# Patient Record
Sex: FEMALE | Race: WHITE | Employment: UNEMPLOYED | URBAN - METROPOLITAN AREA
[De-identification: names, ages, dates, MRNs, and addresses within clinical notes are randomized per-mention and may not be internally consistent; named-entity substitution may affect disease eponyms.]

---

## 2021-11-22 ENCOUNTER — OFFICE VISIT (OUTPATIENT)
Dept: FAMILY MEDICINE CLINIC | Age: 21
End: 2021-11-22
Payer: COMMERCIAL

## 2021-11-22 VITALS
WEIGHT: 115 LBS | DIASTOLIC BLOOD PRESSURE: 78 MMHG | HEART RATE: 78 BPM | TEMPERATURE: 98.3 F | SYSTOLIC BLOOD PRESSURE: 110 MMHG | BODY MASS INDEX: 20.38 KG/M2 | HEIGHT: 63 IN

## 2021-11-22 DIAGNOSIS — R63.4 WEIGHT LOSS: ICD-10-CM

## 2021-11-22 DIAGNOSIS — F98.8 ATTENTION DEFICIT DISORDER (ADD) WITHOUT HYPERACTIVITY: ICD-10-CM

## 2021-11-22 DIAGNOSIS — F32.A ANXIETY AND DEPRESSION: Primary | ICD-10-CM

## 2021-11-22 DIAGNOSIS — Z23 NEEDS FLU SHOT: ICD-10-CM

## 2021-11-22 DIAGNOSIS — R63.8 ALTERATION IN APPETITE: ICD-10-CM

## 2021-11-22 DIAGNOSIS — Z65.9 POOR SOCIAL SITUATION: ICD-10-CM

## 2021-11-22 DIAGNOSIS — Z23 NEED FOR COVID-19 VACCINE: ICD-10-CM

## 2021-11-22 DIAGNOSIS — F41.9 ANXIETY AND DEPRESSION: Primary | ICD-10-CM

## 2021-11-22 DIAGNOSIS — Z13.31 POSITIVE DEPRESSION SCREENING: ICD-10-CM

## 2021-11-22 PROCEDURE — 99204 OFFICE O/P NEW MOD 45 MIN: CPT | Performed by: FAMILY MEDICINE

## 2021-11-22 PROCEDURE — G8431 POS CLIN DEPRES SCRN F/U DOC: HCPCS | Performed by: FAMILY MEDICINE

## 2021-11-22 PROCEDURE — G8420 CALC BMI NORM PARAMETERS: HCPCS | Performed by: FAMILY MEDICINE

## 2021-11-22 PROCEDURE — 1036F TOBACCO NON-USER: CPT | Performed by: FAMILY MEDICINE

## 2021-11-22 PROCEDURE — G8427 DOCREV CUR MEDS BY ELIG CLIN: HCPCS | Performed by: FAMILY MEDICINE

## 2021-11-22 PROCEDURE — G8484 FLU IMMUNIZE NO ADMIN: HCPCS | Performed by: FAMILY MEDICINE

## 2021-11-22 RX ORDER — QUETIAPINE FUMARATE 50 MG/1
50 TABLET, EXTENDED RELEASE ORAL NIGHTLY
Qty: 30 TABLET | Refills: 0 | Status: SHIPPED | OUTPATIENT
Start: 2021-11-22 | End: 2021-12-13 | Stop reason: SDUPTHER

## 2021-11-22 RX ORDER — DEXTROAMPHETAMINE SACCHARATE, AMPHETAMINE ASPARTATE MONOHYDRATE, DEXTROAMPHETAMINE SULFATE AND AMPHETAMINE SULFATE 3.75; 3.75; 3.75; 3.75 MG/1; MG/1; MG/1; MG/1
15 CAPSULE, EXTENDED RELEASE ORAL DAILY
Qty: 30 CAPSULE | Refills: 0 | Status: SHIPPED
Start: 2021-11-22 | End: 2021-12-13 | Stop reason: DRUGHIGH

## 2021-11-22 ASSESSMENT — ANXIETY QUESTIONNAIRES
GAD7 TOTAL SCORE: 12
6. BECOMING EASILY ANNOYED OR IRRITABLE: 3
IF YOU CHECKED OFF ANY PROBLEMS ON THIS QUESTIONNAIRE, HOW DIFFICULT HAVE THESE PROBLEMS MADE IT FOR YOU TO DO YOUR WORK, TAKE CARE OF THINGS AT HOME, OR GET ALONG WITH OTHER PEOPLE: VERY DIFFICULT
1. FEELING NERVOUS, ANXIOUS, OR ON EDGE: 2
5. BEING SO RESTLESS THAT IT IS HARD TO SIT STILL: 3
4. TROUBLE RELAXING: 0
7. FEELING AFRAID AS IF SOMETHING AWFUL MIGHT HAPPEN: 2
3. WORRYING TOO MUCH ABOUT DIFFERENT THINGS: 2
2. NOT BEING ABLE TO STOP OR CONTROL WORRYING: 0

## 2021-11-22 ASSESSMENT — PATIENT HEALTH QUESTIONNAIRE - PHQ9
1. LITTLE INTEREST OR PLEASURE IN DOING THINGS: 2
SUM OF ALL RESPONSES TO PHQ QUESTIONS 1-9: 22
5. POOR APPETITE OR OVEREATING: 2
7. TROUBLE CONCENTRATING ON THINGS, SUCH AS READING THE NEWSPAPER OR WATCHING TELEVISION: 3
6. FEELING BAD ABOUT YOURSELF - OR THAT YOU ARE A FAILURE OR HAVE LET YOURSELF OR YOUR FAMILY DOWN: 1
8. MOVING OR SPEAKING SO SLOWLY THAT OTHER PEOPLE COULD HAVE NOTICED. OR THE OPPOSITE, BEING SO FIGETY OR RESTLESS THAT YOU HAVE BEEN MOVING AROUND A LOT MORE THAN USUAL: 3
SUM OF ALL RESPONSES TO PHQ QUESTIONS 1-9: 22
3. TROUBLE FALLING OR STAYING ASLEEP: 3
SUM OF ALL RESPONSES TO PHQ QUESTIONS 1-9: 20
4. FEELING TIRED OR HAVING LITTLE ENERGY: 3
2. FEELING DOWN, DEPRESSED OR HOPELESS: 3
10. IF YOU CHECKED OFF ANY PROBLEMS, HOW DIFFICULT HAVE THESE PROBLEMS MADE IT FOR YOU TO DO YOUR WORK, TAKE CARE OF THINGS AT HOME, OR GET ALONG WITH OTHER PEOPLE: 2
SUM OF ALL RESPONSES TO PHQ9 QUESTIONS 1 & 2: 5
9. THOUGHTS THAT YOU WOULD BE BETTER OFF DEAD, OR OF HURTING YOURSELF: 2
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
## 2021-11-22 NOTE — PATIENT INSTRUCTIONS
PLEASE BRING YOUR MEDICATIONS TO ALL APPOINTMENTS    The diagnoses and medications listed in this after visit summary may not be accurate at the time of check out. Please check MY CHART in 28-48 hours for possible corrections. Late cancellation policy: So that we can better accommodate people who are sick, please give our office 24 hour notice for an appointment cancellation. Thank you. Missed appointments: Your care is very important to us. It is important that you keep your scheduled appointments. Multiple missed appointments will lead to a dismissal from the office. Later arrival policy: If you are more than 10 minutes late for your appointment, you will be asked to re-schedule. Please allow 5-7 business days for paperwork to be processed. It is important that you check your MY Chart messages, as they include appointment reminders, test results, and other important information. If you have forgotten your password, please call [phone]. HERE ARE SOME LIFE CHANGING TIPS      1. Take your blood pressure medications at bedtime to reduce your chance of heart attack or stroke  2. Fever in kids:  Give both Tylenol and Ibuprofen at the same time rather than staggering them   3. Follow these tips to reduce childhood obesity: Reduce unnecessary exposure to antibiotics, consume whole milk instead of skim milk, watch public TV instead of regular TV (less exposure to junk food commercials), and reduce traumatic experiences. 4. 1 egg per day is good for your heart  5. Alternate day fasting does promote weight loss. Skipping breakfast increases your risk of obesity  6. Artificially sweetened drinks increase all cause mortality (strokes, body mass index, cardiovascular disease)  7. Kale consumption can reduce onset of dementia  8. Walking at least 8000 steps per day and resistance exercise 2-3 x per week are good for your heart  9.  Brushing teeth 3 times per day can decrease chance of getting diabetes  10. Antibiotic use is associated with a lifetime increased risk of breast cancer and heart disease.

## 2021-12-13 ENCOUNTER — OFFICE VISIT (OUTPATIENT)
Dept: FAMILY MEDICINE CLINIC | Age: 21
End: 2021-12-13
Payer: COMMERCIAL

## 2021-12-13 VITALS
TEMPERATURE: 97.3 F | HEIGHT: 60 IN | BODY MASS INDEX: 23.36 KG/M2 | DIASTOLIC BLOOD PRESSURE: 72 MMHG | WEIGHT: 119 LBS | HEART RATE: 95 BPM | SYSTOLIC BLOOD PRESSURE: 116 MMHG

## 2021-12-13 DIAGNOSIS — F32.A ANXIETY AND DEPRESSION: Primary | ICD-10-CM

## 2021-12-13 DIAGNOSIS — Z23 NEED FOR COVID-19 VACCINE: ICD-10-CM

## 2021-12-13 DIAGNOSIS — F41.9 ANXIETY AND DEPRESSION: Primary | ICD-10-CM

## 2021-12-13 DIAGNOSIS — N60.11 FIBROCYSTIC BREAST CHANGES OF BOTH BREASTS: ICD-10-CM

## 2021-12-13 DIAGNOSIS — F98.8 ATTENTION DEFICIT DISORDER (ADD) WITHOUT HYPERACTIVITY: ICD-10-CM

## 2021-12-13 DIAGNOSIS — N60.12 FIBROCYSTIC BREAST CHANGES OF BOTH BREASTS: ICD-10-CM

## 2021-12-13 PROCEDURE — G8427 DOCREV CUR MEDS BY ELIG CLIN: HCPCS | Performed by: FAMILY MEDICINE

## 2021-12-13 PROCEDURE — G8420 CALC BMI NORM PARAMETERS: HCPCS | Performed by: FAMILY MEDICINE

## 2021-12-13 PROCEDURE — 1036F TOBACCO NON-USER: CPT | Performed by: FAMILY MEDICINE

## 2021-12-13 PROCEDURE — G8484 FLU IMMUNIZE NO ADMIN: HCPCS | Performed by: FAMILY MEDICINE

## 2021-12-13 PROCEDURE — 99214 OFFICE O/P EST MOD 30 MIN: CPT | Performed by: FAMILY MEDICINE

## 2021-12-13 RX ORDER — DEXTROAMPHETAMINE SACCHARATE, AMPHETAMINE ASPARTATE MONOHYDRATE, DEXTROAMPHETAMINE SULFATE AND AMPHETAMINE SULFATE 5; 5; 5; 5 MG/1; MG/1; MG/1; MG/1
20 CAPSULE, EXTENDED RELEASE ORAL EVERY MORNING
Qty: 30 CAPSULE | Refills: 0 | Status: SHIPPED | OUTPATIENT
Start: 2021-12-13 | End: 2022-01-13 | Stop reason: SDUPTHER

## 2021-12-13 RX ORDER — QUETIAPINE FUMARATE 50 MG/1
50 TABLET, EXTENDED RELEASE ORAL NIGHTLY
Qty: 30 TABLET | Refills: 5 | Status: SHIPPED | OUTPATIENT
Start: 2021-12-13

## 2021-12-13 ASSESSMENT — ANXIETY QUESTIONNAIRES
5. BEING SO RESTLESS THAT IT IS HARD TO SIT STILL: 0
6. BECOMING EASILY ANNOYED OR IRRITABLE: 3
IF YOU CHECKED OFF ANY PROBLEMS ON THIS QUESTIONNAIRE, HOW DIFFICULT HAVE THESE PROBLEMS MADE IT FOR YOU TO DO YOUR WORK, TAKE CARE OF THINGS AT HOME, OR GET ALONG WITH OTHER PEOPLE: NOT DIFFICULT AT ALL
2. NOT BEING ABLE TO STOP OR CONTROL WORRYING: 0
7. FEELING AFRAID AS IF SOMETHING AWFUL MIGHT HAPPEN: 0
4. TROUBLE RELAXING: 0
GAD7 TOTAL SCORE: 6
3. WORRYING TOO MUCH ABOUT DIFFERENT THINGS: 1
1. FEELING NERVOUS, ANXIOUS, OR ON EDGE: 2

## 2021-12-13 ASSESSMENT — PATIENT HEALTH QUESTIONNAIRE - PHQ9
6. FEELING BAD ABOUT YOURSELF - OR THAT YOU ARE A FAILURE OR HAVE LET YOURSELF OR YOUR FAMILY DOWN: 0
SUM OF ALL RESPONSES TO PHQ QUESTIONS 1-9: 2
5. POOR APPETITE OR OVEREATING: 1
2. FEELING DOWN, DEPRESSED OR HOPELESS: 1
7. TROUBLE CONCENTRATING ON THINGS, SUCH AS READING THE NEWSPAPER OR WATCHING TELEVISION: 0
SUM OF ALL RESPONSES TO PHQ9 QUESTIONS 1 & 2: 1
8. MOVING OR SPEAKING SO SLOWLY THAT OTHER PEOPLE COULD HAVE NOTICED. OR THE OPPOSITE, BEING SO FIGETY OR RESTLESS THAT YOU HAVE BEEN MOVING AROUND A LOT MORE THAN USUAL: 0
10. IF YOU CHECKED OFF ANY PROBLEMS, HOW DIFFICULT HAVE THESE PROBLEMS MADE IT FOR YOU TO DO YOUR WORK, TAKE CARE OF THINGS AT HOME, OR GET ALONG WITH OTHER PEOPLE: 1
SUM OF ALL RESPONSES TO PHQ QUESTIONS 1-9: 2
1. LITTLE INTEREST OR PLEASURE IN DOING THINGS: 0
SUM OF ALL RESPONSES TO PHQ QUESTIONS 1-9: 2
9. THOUGHTS THAT YOU WOULD BE BETTER OFF DEAD, OR OF HURTING YOURSELF: 0
4. FEELING TIRED OR HAVING LITTLE ENERGY: 0
3. TROUBLE FALLING OR STAYING ASLEEP: 0

## 2021-12-13 NOTE — PROGRESS NOTES
Patient ID: Malina Guaman 2000    . Chief Complaint   Patient presents with    Anxiety    Depression    Breast Pain     lumps and pain in both breast x 1 week     PHQ Scores 12/13/2021 11/22/2021   PHQ2 Score 1 5   PHQ9 Score 2 22       Depression and anxiety: much improved with the Seroquel. Sleeping better now    ADD: is taking the Adderall, but it's not working. Is failing all her classes. Did not log on for school for several days. Breast lumps: Just noticed this in the last week. She does not have menstrual cycles because she is on the Mirena. She does consume chocolate but not every day. She rarely consumes caffeine. Interpretation of Total Score Depression Severity: 1-4 = Minimal depression, 5-9 = Mild depression, 10-14 = Moderate depression, 15-19 = Moderately severe depression, 20-27 = Severe depression      CHRISTIANO 7 SCORE 12/13/2021 11/22/2021   CHRISTIANO-7 Total Score 6 12     Interpretation of CHRISTIANO-7 score: 5-9 = mild anxiety, 10-14 = moderate anxiety, 15+ = severe anxiety. Recommend referral to behavioral health for scores 10 or greater. HPI    Review of Systems    Patient Active Problem List   Diagnosis    Attention deficit disorder (ADD) without hyperactivity       No past surgical history on file. Family History   Problem Relation Age of Onset    Drug Abuse Mother     Heart Disease Mother         type unknown       No current outpatient medications on file prior to visit. No current facility-administered medications on file prior to visit. Objective:         Physical Exam  Vitals and nursing note reviewed. Constitutional:       General: She is not in acute distress. Appearance: She is well-developed. HENT:      Head: Normocephalic and atraumatic. Cardiovascular:      Rate and Rhythm: Normal rate and regular rhythm.       Heart sounds: Normal heart sounds, S1 normal and S2 normal.   Pulmonary:      Effort: Pulmonary effort is normal. No respiratory distress. Breath sounds: Normal breath sounds. No wheezing. Chest:       Musculoskeletal:      Cervical back: Neck supple. Skin:     General: Skin is warm and dry. Neurological:      Mental Status: She is alert and oriented to person, place, and time. Comments: . Psychiatric:         Attention and Perception: She is attentive. Speech: Speech normal.         Behavior: Behavior normal.         Thought Content: Thought content normal.         Judgment: Judgment normal.       Vitals:    12/13/21 1450   BP: 116/72   Site: Left Upper Arm   Position: Sitting   Cuff Size: Medium Adult   Pulse: 95   Temp: 97.3 °F (36.3 °C)   TempSrc: Infrared   Weight: 119 lb (54 kg)   Height: 5' 0.25\" (1.53 m)     Body mass index is 23.05 kg/m². Wt Readings from Last 3 Encounters:   12/13/21 119 lb (54 kg)   11/22/21 115 lb (52.2 kg)     BP Readings from Last 3 Encounters:   12/13/21 116/72   11/22/21 110/78          No results found for this visit on 12/13/21. The ASCVD Risk score (Maria Dolores Castaneda, et al., 2013) failed to calculate for the following reasons: The 2013 ASCVD risk score is only valid for ages 36 to 78  Lab Review   No results found for any previous visit. Assessment:       Diagnosis Orders   1. Anxiety and depression  QUEtiapine (SEROQUEL XR) 50 MG extended release tablet   2. Need for COVID-19 vaccine     3. Attention deficit disorder (ADD) without hyperactivity  amphetamine-dextroamphetamine (ADDERALL XR) 20 MG extended release capsule   4. Fibrocystic breast changes of both breasts             Plan:      Depression anxiety much improved with the Seroquel. Plan at this time is to continue Seroquel    ADD seems to be not improved. Will increase the dose of the Adderall and recheck in 1 month    Strongly encourage her to get Covid vaccine    I think patient has fibrocystic change of the breast. Re-assurance given. Will re-assess next time.   Cut back on chocolate      Return for ADD, Doxy.

## 2021-12-13 NOTE — PATIENT INSTRUCTIONS
PLEASE BRING YOUR MEDICATIONS TO ALL APPOINTMENTS    The diagnoses and medications listed in this after visit summary may not be accurate at the time of check out. Please check MY CHART in 28-48 hours for possible corrections. Late cancellation policy: So that we can better accommodate people who are sick, please give our office 24 hour notice for an appointment cancellation. Thank you. Missed appointments: Your care is very important to us. It is important that you keep your scheduled appointments. Multiple missed appointments will lead to a dismissal from the office. Later arrival policy: If you are more than 10 minutes late for your appointment, you will be asked to re-schedule. Please allow 5-7 business days for paperwork to be processed. It is important that you check your MY Chart messages, as they include appointment reminders, test results, and other important information. If you have forgotten your password, please call [phone]. HERE ARE SOME LIFE CHANGING TIPS      1. Take your blood pressure medications at bedtime to reduce your chance of heart attack or stroke  2. Fever in kids:  Give both Tylenol and Ibuprofen at the same time rather than staggering them   3. Follow these tips to reduce childhood obesity: Reduce unnecessary exposure to antibiotics, consume whole milk instead of skim milk, watch public TV instead of regular TV (less exposure to junk food commercials), and reduce traumatic experiences. 4. 1 egg per day is good for your heart  5. Alternate day fasting does promote weight loss. Skipping breakfast increases your risk of obesity  6. Artificially sweetened drinks increase all cause mortality (strokes, body mass index, cardiovascular disease)  7. Kale consumption can reduce onset of dementia  8. Walking at least 8000 steps per day and resistance exercise 2-3 x per week are good for your heart  9.  Brushing teeth 3 times per day can decrease chance of getting diabetes  10. Antibiotic use is associated with a lifetime increased risk of breast cancer and heart disease.

## 2022-01-13 ENCOUNTER — VIRTUAL VISIT (OUTPATIENT)
Dept: FAMILY MEDICINE CLINIC | Age: 22
End: 2022-01-13
Payer: COMMERCIAL

## 2022-01-13 DIAGNOSIS — F98.8 ATTENTION DEFICIT DISORDER (ADD) WITHOUT HYPERACTIVITY: ICD-10-CM

## 2022-01-13 DIAGNOSIS — N63.20 LEFT BREAST MASS: Primary | ICD-10-CM

## 2022-01-13 PROCEDURE — 99442 PR PHYS/QHP TELEPHONE EVALUATION 11-20 MIN: CPT | Performed by: FAMILY MEDICINE

## 2022-01-13 RX ORDER — DEXTROAMPHETAMINE SACCHARATE, AMPHETAMINE ASPARTATE MONOHYDRATE, DEXTROAMPHETAMINE SULFATE AND AMPHETAMINE SULFATE 5; 5; 5; 5 MG/1; MG/1; MG/1; MG/1
20 CAPSULE, EXTENDED RELEASE ORAL EVERY MORNING
Qty: 30 CAPSULE | Refills: 0 | Status: SHIPPED | OUTPATIENT
Start: 2022-01-15 | End: 2022-02-14

## 2022-01-13 RX ORDER — DEXTROAMPHETAMINE SACCHARATE, AMPHETAMINE ASPARTATE MONOHYDRATE, DEXTROAMPHETAMINE SULFATE AND AMPHETAMINE SULFATE 5; 5; 5; 5 MG/1; MG/1; MG/1; MG/1
20 CAPSULE, EXTENDED RELEASE ORAL EVERY MORNING
Qty: 30 CAPSULE | Refills: 0 | Status: SHIPPED | OUTPATIENT
Start: 2022-03-15 | End: 2022-04-14

## 2022-01-13 RX ORDER — DEXTROAMPHETAMINE SACCHARATE, AMPHETAMINE ASPARTATE MONOHYDRATE, DEXTROAMPHETAMINE SULFATE AND AMPHETAMINE SULFATE 5; 5; 5; 5 MG/1; MG/1; MG/1; MG/1
20 CAPSULE, EXTENDED RELEASE ORAL EVERY MORNING
Qty: 30 CAPSULE | Refills: 0 | Status: SHIPPED | OUTPATIENT
Start: 2022-02-15 | End: 2022-03-17

## 2022-01-13 NOTE — PROGRESS NOTES
Vasu Vázquez is a 22 y.o. female evaluated via telephone on 1/13/2022. Consent:  She and/or health care decision maker is aware that that she may receive a bill for this telephone service, depending on her insurance coverage, and has provided verbal consent to proceed: Yes      Documentation:  I communicated with the patient and/or health care decision maker about     ADD:  Much improved. Has been able to get things done around the house. She has been catching up on her schoolwork she had fallen quite behind because of her ADD. She is taking her Adderall most days. She like to continue taking it    Breast mass: She has a left breast mass. It is gotten bigger since the last time she was here. Details of this discussion including any medical advice provided:      Diagnosis Orders   1. Left breast mass  SUMAN DIGITAL DIAGNOSTIC AUGMENTED BILATERAL   2. Attention deficit disorder (ADD) without hyperactivity  amphetamine-dextroamphetamine (ADDERALL XR) 20 MG extended release capsule    amphetamine-dextroamphetamine (ADDERALL XR) 20 MG extended release capsule    amphetamine-dextroamphetamine (ADDERALL XR) 20 MG extended release capsule     Controlled Substance Monitoring:    Acute and Chronic Pain Monitoring:   RX Monitoring 1/13/2022   Periodic Controlled Substance Monitoring Possible medication side effects, risk of tolerance/dependence & alternative treatments discussed. ;No signs of potential drug abuse or diversion identified. Patient doing well in regards to ADD. Continue Adderall. F/u mammo results when they are available. I affirm this is a Patient Initiated Episode with a Patient who has not had a related appointment within my department in the past 7 days or scheduled within the next 24 hours.     Patient identification was verified at the start of the visit: Yes    Total Time: minutes: 5-10 minutes    The visit was conducted pursuant to the emergency declaration under the [address] and the [address] authority and the [employer] and [place] General Act. Patient identification was verified, and a caregiver was present when appropriate. The patient was located in a state where the provider was credentialed to provide care.     Note: not billable if this call serves to triage the patient into an appointment for the relevant concern      Sebastian Ochoa MD

## 2022-01-13 NOTE — PROGRESS NOTES
2022    TELEHEALTH EVALUATION -- Audio/Visual (During [id number]-[id number] public health emergency)    HPI:    Navid Lynch (:  2000) has requested an audio/video evaluation for the following concern(s):    ***    Review of Systems    Prior to Visit Medications    Medication Sig Taking? Authorizing Provider   levonorgestrel (MIRENA) IUD 52 mg 1 each by IntraUTERine route once Yes Historical Provider, MD   amphetamine-dextroamphetamine (ADDERALL XR) 20 MG extended release capsule Take 1 capsule by mouth every morning for 30 doses. Yes Greg Lovell MD   QUEtiapine (SEROQUEL XR) 50 MG extended release tablet Take 1 tablet by mouth nightly Yes Greg Lovell MD       Social History     Tobacco Use    Smoking status: Never Smoker    Smokeless tobacco: Never Used   Vaping Use    Vaping Use: Every day   Substance Use Topics    Alcohol use: Not on file    Drug use: Not on file        {F2 for Optional History SmartBlocks:61600}    PHYSICAL EXAMINATION:  [ INSTRUCTIONS:  \"[x]\" Indicates a positive item  \"[]\" Indicates a negative item  -- DELETE ALL ITEMS NOT EXAMINED]  Vital Signs: (As obtained by patient/caregiver or practitioner observation)    Blood pressure-  Heart rate-    Respiratory rate-    Temperature-  Pulse oximetry-     Constitutional: [] Appears well-developed and well-nourished [] No apparent distress      [] Abnormal-   Mental status  [] Alert and awake  [] Oriented to person/place/time []Able to follow commands      Eyes:  EOM    []  Normal  [] Abnormal-  Sclera  []  Normal  [] Abnormal -         Discharge []  None visible  [] Abnormal -    HENT:   [] Normocephalic, atraumatic.   [] Abnormal   [] Mouth/Throat: Mucous membranes are moist.     External Ears [] Normal  [] Abnormal-     Neck: [] No visualized mass     Pulmonary/Chest: [] Respiratory effort normal.  [] No visualized signs of difficulty breathing or respiratory distress        [] Abnormal-      Musculoskeletal:   [] Normal gait with no signs of ataxia         [] Normal range of motion of neck        [] Abnormal-       Neurological:        [] No Facial Asymmetry (Cranial nerve 7 motor function) (limited exam to video visit)          [] No gaze palsy        [] Abnormal-         Skin:        [] No significant exanthematous lesions or discoloration noted on facial skin         [] Abnormal-            Psychiatric:       [] Normal Affect [] No Hallucinations        [] Abnormal-     Other pertinent observable physical exam findings-     ASSESSMENT/PLAN:  There are no diagnoses linked to this encounter. No follow-ups on file. [place], was evaluated through a synchronous (real-time) audio-video encounter. The patient (or guardian if applicable) is aware that this is a billable service. Verbal consent to proceed has been obtained within the past 12 months. The visit was conducted pursuant to the emergency declaration under the [address] [place] [address], [address] [place] [address] authority and the [employer] and [employer] General Act. Patient identification was verified, and a caregiver was present when appropriate. The patient was located in a state where the provider was credentialed to provide care. Total time spent on this encounter: {Time Spent:82043903}    --Sebastian Ochoa MD on 2022 at 9:31 AM    An electronic signature was used to authenticate this note.